# Patient Record
Sex: FEMALE | Race: WHITE | Employment: UNEMPLOYED | URBAN - METROPOLITAN AREA
[De-identification: names, ages, dates, MRNs, and addresses within clinical notes are randomized per-mention and may not be internally consistent; named-entity substitution may affect disease eponyms.]

---

## 2020-08-31 ENCOUNTER — HOSPITAL ENCOUNTER (OUTPATIENT)
Age: 85
Discharge: HOME | End: 2020-08-31
Payer: MEDICARE

## 2020-08-31 ENCOUNTER — HOSPITAL ENCOUNTER (OUTPATIENT)
Dept: GENERAL RADIOLOGY | Age: 85
Discharge: HOME | End: 2020-08-31
Payer: MEDICARE

## 2020-08-31 PROCEDURE — 73562 X-RAY EXAM OF KNEE 3: CPT

## 2020-08-31 PROCEDURE — 73502 X-RAY EXAM HIP UNI 2-3 VIEWS: CPT

## 2020-08-31 PROCEDURE — 72100 X-RAY EXAM L-S SPINE 2/3 VWS: CPT

## 2022-05-10 ENCOUNTER — OFFICE VISIT (OUTPATIENT)
Dept: SURGERY | Age: 87
End: 2022-05-10
Payer: MEDICARE

## 2022-05-10 VITALS
TEMPERATURE: 97.6 F | SYSTOLIC BLOOD PRESSURE: 152 MMHG | HEART RATE: 86 BPM | OXYGEN SATURATION: 97 % | DIASTOLIC BLOOD PRESSURE: 77 MMHG | BODY MASS INDEX: 35.7 KG/M2 | HEIGHT: 62 IN | WEIGHT: 194 LBS

## 2022-05-10 DIAGNOSIS — K64.3 GRADE IV HEMORRHOIDS: Primary | ICD-10-CM

## 2022-05-10 DIAGNOSIS — R15.9 INCONTINENCE OF FECES WITH FECAL URGENCY: ICD-10-CM

## 2022-05-10 DIAGNOSIS — R15.2 INCONTINENCE OF FECES WITH FECAL URGENCY: ICD-10-CM

## 2022-05-10 PROCEDURE — G8427 DOCREV CUR MEDS BY ELIG CLIN: HCPCS | Performed by: SURGERY

## 2022-05-10 PROCEDURE — 1036F TOBACCO NON-USER: CPT | Performed by: SURGERY

## 2022-05-10 PROCEDURE — 99203 OFFICE O/P NEW LOW 30 MIN: CPT | Performed by: SURGERY

## 2022-05-10 PROCEDURE — 1090F PRES/ABSN URINE INCON ASSESS: CPT | Performed by: SURGERY

## 2022-05-10 PROCEDURE — 4040F PNEUMOC VAC/ADMIN/RCVD: CPT | Performed by: SURGERY

## 2022-05-10 PROCEDURE — G8417 CALC BMI ABV UP PARAM F/U: HCPCS | Performed by: SURGERY

## 2022-05-10 PROCEDURE — 1123F ACP DISCUSS/DSCN MKR DOCD: CPT | Performed by: SURGERY

## 2022-05-10 RX ORDER — LOSARTAN POTASSIUM 25 MG/1
25 TABLET ORAL DAILY
COMMUNITY

## 2022-05-10 NOTE — PROGRESS NOTES
[facility]  [address]  Dept: [phone]  Dept Fax: [phone]  Loc: [phone]    Visit Date: 5/10/2022    Rogers Pierson is a 80 y.o. female who presents today for: New Patient (hemorrhoids)      HPI:       Rogers Pierson is a 80 y.o. female referred by Dr. Romario Michelle of  for hemorrhoids. Patient has had several years of hemorrhoids symptoms. Main complaints include: prolapse, occas discomfort. Also noted to have some fecal incontinence symptoms and fecal urgency. Symptoms worse with constipation  Patient has tried: OTC, suppostiries  Previous anorectal procedures/banding: yes - 5 yrs ago - [facility]    Patient denies fever, chills, abd pain, nausea, emesis, weight loss. Patient's problem list, medications, past medical, surgical, family, and social histories were reviewed and updated in the chart as indicated today. No past medical history on file. No past surgical history on file. Family History: Family history of colorectal cancer/polyps: no    Social History:   Social History     Tobacco Use    Smoking status: Never Smoker    Smokeless tobacco: Never Used   Substance Use Topics    Alcohol use: Yes      Tobacco cessation counseling provided as appropriate. REVIEW OF SYSTEMS:    Pertinent positives and negatives are mentioned in the HPI above. Otherwise, all other systems were reviewed and negative. Objective:     Physical Exam   BP (!) 152/77   Pulse 86   Temp 97.6 °F (36.4 °C) (Infrared)   Ht 5' 2\" (1.575 m)   Wt 194 lb (88 kg)   SpO2 97%   BMI 35.48 kg/m²   Constitutional: Appears well-developed and well-nourished. Grooming appropriate. No gross deformities. Body mass index is 35.48 kg/m². Eyes: No scleral icterus. Conjunctiva/lids normal. Vision intact grossly. Pupils equal/symmetric, reactive bilaterally.   ENT: External ears/nose without defect, scars, or masses. Hearing grossly intact. No facial deformity. Lips normal, normal dentition. Neck: No masses. Trachea midline. No crepitus. Thyroid not enlarged. Cardiovascular: Normal rate. No peripheral edema. Abdominal aorta normal size to palpation. Pulmonary/Chest: Effort normal. No respiratory distress. No wheezes. No use of accessory muscles. Musculoskeletal: Normal range of motion x all 4 extremities and head/neck, without deformity, pain, or crepitus, with normal strength and tone. Normal gait. Nails without clubbing or cyanosis. Neurological: Alert and oriented to person, place, and time. No gross deficits. Sensation intact. Skin: Skin is dry. No rashes noted. No pallor. No induration of nodules. Psychiatric: Normal mood and affect. Behavior normal. Oriented to person, place, and time. Judgment and insight reasonable. Abdominal/wound: soft, nontender    Anorectal exam:    ANOSCOPY:    Chaperone/MA present in room during entire exam. Patient was placed in left lateral down or knee-chest positioning depending on patient comfort. Exam table manipulated for proper visualization and lighting. Buttocks spread. Inspection reveals: no perianal skin lesions, excoriation. Grade 4 prolapsing internal/external hemorrhoids    Digital exam performed with lubricated index finger revealing: no masses, induration, or tenderness. No gross blood. Normal tone. Lubricated anoscope inserted gently into anus and withdrawn with light attachment for visualization of distal rectum and anal canal: Mucosa appeared normal, without masses or evidence of proctitis. Grade 4 internal/external hemorrhoidal tissue noted. Anoscope removed without difficulty. Patient tolerated procedure well without complications. See below for further recommendations.       Last colonoscopy: 5 years ago per her recollection      Assessment/Plan:     A/P:  New problem(s): Hemorrhoids: Internal/external, grade 4; also with fecal urgency/incontinence  Established problem(s): constiption  Additional workup/treatment planned and options discussed:   1) Lifestyle and stool regimen: Fiber supplementation, sitz baths, improving dietary and lifestyle choices  2) Procedural: Rubber band ligation  3) Hemorrhoidectomy - if fails less invasive meaures  Risk of complications/morbidity: moderate    Patient has signs, symptoms, and exam consistent with grade 4 internal/external hemorrhoids. States that her symptoms are worse when she has issues with constipation. We discussed the pathophysiology, etiology, natural history, workup, and treatment options, including procedural and surgical risks for hemorrhoids. We will start with conservative management, including OTC or prescription fiber supplementation, water, healthy fruits and vegetables, and medical management. We discussed avoiding straining, constipation, and diarrhea. Sitz baths discussed. If symptoms do not improve in a reasonable amount of time or patient is already having regular soft stool with conservative management, patient may require more invasive treatment options, such as in-office rubber band ligation. We discussed the rubber band ligation procedure, including prep, expectations, potential complications, postoperative discomfort. We discussed that this would be without sedation or anesthesia, and would likely result in temporary mild pressure discomfort. We discussed functional expectations, and risks of recurrence of hemorrhoids in the future. We discussed potential need for multiple banding sessions. As we were getting ready to end the conversation she also brought up fecal incontinence. I provided additional information and a print out for her regarding this, but we will need to discuss this further at her next office visit given our time constraints today.     Up to date on colonoscopy per her recollection    I provided written information in the After Visit Summary AVS Regarding: Hemorrhoids, rubber band ligation, fecal incontinence    DISPOSITION:  F/u for symptom reassessment, possible rubber band ligation; further discussion regarding fecal incontinence issues    My findings will be relayed to consulting practitioner or PCP via Epic    Note completed using dictation software, please excuse any errors.     Electronically signed by Rose Garcia MD on 5/10/2022 at 4:12 PM

## 2022-05-10 NOTE — PATIENT INSTRUCTIONS
Fecal Incontinence: Information and Care Instructions    Fecal incontinence is the loss of normal control of your bowels. You may not be able to reach the toilet in time for a bowel movement, or stool may leak from your anus. Fecal incontinence can be caused by constipation, diarrhea, or anxiety or other emotional stress. It can also result from nerve injury, muscle damage (especially from childbirth), lack of exercise, or poor diet. Some medications or chronic medical problems (such as diabetes) can cause or worsen incontinence. Treatment of fecal incontinence depends on what caused it and how bad it is. It may include changes to your diet, medicine, bowel training, or surgery. More than one treatment may be needed. Loss of bowel control can be hard to deal with. You may feel ashamed or embarrassed, and you may not want to leave the house because you fear that you might have an accident in public. But treatment can help you better control your bowels and manage your incontinence. Follow-up care is a key part of your treatment and safety. Be sure to make and go to all appointments, and call your doctor if you are having problems. It's also a good idea to know your test results and keep a list of the medicines you take. How can you care for yourself at home? · Keep a diary for 2 weeks - record how often you have incontinence, and whether you loose control of solid, liquid, or gas. Write down what you eat. This will help you learn which foods make your incontinence worse. · If you and your doctor feel that constipation is part of the problem:  ¨ Include fruits, vegetables, beans, and whole grains in your diet each day. These foods are high in fiber. ¨ Drink plenty of fluids, enough so that your urine is light yellow or clear like water. If you have kidney, heart, or liver disease and have to limit fluids, talk with your doctor before you increase the amount of fluids you drink.   ¨ Get some exercise every day. Build up slowly to 30 to 60 minutes a day on 5 or more days of the week. ¨ Take a fiber supplement, such as Citrucel or Metamucil, every day. Read and follow all instructions on the label. · If you and your doctor feel that diarrhea is part of the problem, try to avoid:  ¨ Alcohol. ¨ Caffeine. This is found in coffee, tea, cola drinks, and chocolate. ¨ Nicotine, from smoking or chewing tobacco.  ¨ Gas-producing foods. These include beans, broccoli, cabbage, and apples. ¨ Dairy products that contain lactose (milk sugar). Examples are ice cream, milk, cheese, and sour cream.  ¨ Foods and drinks high in sugar, especially fruit juice, soda, candy, and other packaged sweets (such as cookies). ¨ Foods high in fat. These include duffy, sausage, butter, oils, and anything deep-fried. ¨ Sorbitol and xylitol. These artificial sweeteners are found in some sugarless candies and chewing gum. · Sometimes additional medications such as imodium or lomotil are used to control diarrhea, which can contribute to incontinence. Start with a low dose, and slowly increase until stools are regular and soft without the need to strain. · How can you keep yourself clean:  ¨ After bowel movements, cleanse gently with non-medicated moist baby wipes. Let air dry, and then use a zinc-based barrier cream (such as Desitin or Calmoseptine) to protect the skin. Wear loose fitting clothing. Avoid using rough toilet paper or excessive wiping. · Do pelvic floor (Kegel) exercises, which tighten and strengthen the pelvic muscles. To do Kegel exercises:  ¨ Squeeze the same muscles you would use to stop your urine. Your belly and thighs should not move. ¨ Hold the squeeze for 3 seconds, and then relax for 3 seconds. ¨ Start with 3 seconds. Then add 1 second each week until you are able to squeeze for 10 seconds. ¨ Repeat the exercise 10 to 15 times a session.  Do three or more sessions a day    What are the next steps if symptoms are not improved with diet changes and home exercises? · Most patients will be referred for pelvic floor physical therapy. This is offered at [facility] [place]. There are specialized physical therapists that will help you regain strength in your pelvic muscles and retrain you to sense your bowel function. This takes place over the course of about 3 months. · Some patients will be referred to a Urogynecologist if there are concomitant urinary or gynecologic issues that may be contributing to fecal incontinence    · Depending on improvement from physical therapy and dietary changes, some patients may be sent for additional testing, such as ultrasound, barium enemas, colonoscopy, or MRI. · Some patients find much improvement in their quality of life with a sacral nerve stimulator. This procedure is performed in two parts. First, temporary wires (\"leads\") are placed, and your response is measured over the course of one week. If you have at least a 50% improvement in symptoms, a second procedure is performed in which the permament stimulator is implanted. Both of these procedures are performed in the operating room with sedation, and are both same-day surgeries. Long term success rates are generally very good. · Rarely patients may be candidates for surgical sphincter reconstruction, though long term success rates are a bit discouraging. · Occasionally, there are other reasons for incontinence. In these specific scenarios, the primary problem should be addressed: rectal prolapse, rectal cancer or polyps, severe hemorrhoids, rectocele, Crohn's disease, ulcerative colitis, certain infections of the colon or rectum. Most of these can be assessed for by office exam and colonoscopy. Please remember, fecal incontinence can be complex, and there may be multiple factors involved in addressing and treating this disorder. Be patient and diligent.     You will see the surgeon every 2-3 months depending on your progress and improvement with various treatments. Please call the office at any time if you have any questions or need to set up a follow up appointment: [phone]          Hemorrhoids: Information and Care Instructions        Hemorrhoids are enlarged veins that develop in the anal canal. They can occur with constipation, diarrhea, straining and pushing during bowel movements, pregnancy, and smoking/coughing. The tissue can get irritated and inflamed, causing bleeding, itching, swelling, and pain. Hemorrhoids can be internal or external (or occasionally both). Sometimes internal hemorrhoids can prolapse, or come out of the anus, causing difficulty keeping clean, mucus drainage, bleeding, and discomfort. External hemorrhoids are more likely to clot and cause sudden severe pain on the outside of the anus. They can be uncomfortable at times, but hemorrhoids rarely are a life-threatening problem. However, not all bleeding and pain can be blamed on hemorrhoids until a thorough examination (and sometimes a colonoscopy) is performed. The initial treatment for both internal and external hemorrhoids is the same, as below:    STOOL REGIMEN for hemorrhoids:    Stools should be soft, formed, and easy to pass consistency, not diarrhea, without the need to strain. 1) Eat a healthy diet with lots of fruits and vegetables. Exercise and be active. 2) Use a fiber supplement twice daily (Metamucil, Benefiber, Konsyl, Citrucel, generic brand, etc) per package instructions. - Women should aim for 25 grams of fiber daily.    - Men should aim for 30-35 grams of fiber daily. 3) Drink 6-8 glasses of water per day. This will work with the fiber to regulate your stools. 4) MiraLax is safe to use every day, and can be used to help lubricate and soften stool. 5) Colace stool softeners can also be used as needed.  Avoid Senna and sennakot laxative products, as they may damage the colon with long-term use.    Warm water sitz baths (sit in a tub filled with 3-4 inches of warm water) can be done 1-2 times daily for 5 min to help with hygiene and relaxation. DO NOT STRAIN ON THE TOILET. DO NOT READ OR PLAY CELL PHONE GAMES ON THE TOILET. Stool regimen should be trialed for 6-8 weeks before considering additional procedures or surgery. What procedures are available for internal hemorrhoids that do not respond to the above stool regimen:    · Rubber Band Ligation: This procedure is performed in the office without anesthetic. A small scope is placed into the anus and small rubber bands are placed over the hemorrhoid tissue. This causes excess tissue to fall off and scar into the rectum. This is done for patients with internal hemorrhoids only. Often this procedure needs to be repeated. Complication rates are very low. There is only minor discomfort and no down time. · Surgical excision (Hemorrhoidectomy): This surgery is performed in the operating room with sedation. The hemorrhoid tissue is cut out and wounds are stitched back together. It has low complications rates and has a 95% long term success rate. It is a painful procedure, however, and most patients require at least 2 weeks off from work/school. This may be recommended for patients with large hemorrhoids, and those who wish to have internal and external hemorrhoids addressed simultaneously, and those who desire the most definitive procedure. · Colonoscopy: This is an adjunct procedure in patients with rectal bleeding. Depending on your age and family history, colonoscopy may be recommended before pursuing hemorrhoid procedures. This is to ensure that the source of bleeding is truly hemorrhoids, and not from polyps, cancer, or inflammation located elsewhere in the colon or rectum. What about over-the-counter ointments, creams, and suppositories?     Unfortunately for consumers, there is very little actual scientific data to support the use of any over-the-counter products. These usually aren't harmful to try for a few weeks, and may help with some symptoms, but all in all are a waste of money. Again, these will just merely mask the symptoms and do not actually address the underlying problem. Some of these (especially steroid-based creams), can actually cause damage to the anus and skin if used over a longer period of time (more than 3 weeks). What about external hemorrhoids? External hemorrhoids can clot or \"thrombose\". This can cause sudden onset of severe pain. Typically the clot will dissolve or push through the skin on its own within 5-7 days. However, if patients are seen within the first 1-3 days of the start of the pain, the clot and external hemorrhoid can be excised (cut) in the office, reducing the duration of pain and reducing healing time. This is typically done with local anesthetic injection. After an external hemorrhoid heals, typically there is a small skin tag that is left behind. No treatment is necessary for skin tags unless there is interference with hygiene. When should you call the office? · You have any questions or concerns. · You have excessive bleeding, fever, chills, or inability to urinate. · The office phone # is [phone]  · If you are unable to reach the office (outside of normal business hours) and you have any concerns, go to your nearest emergency room.

## 2022-06-30 ENCOUNTER — TELEPHONE (OUTPATIENT)
Dept: SURGERY | Age: 87
End: 2022-06-30

## 2022-08-10 ENCOUNTER — OFFICE VISIT (OUTPATIENT)
Dept: SURGERY | Age: 87
End: 2022-08-10
Payer: MEDICARE

## 2022-08-10 VITALS
HEART RATE: 73 BPM | DIASTOLIC BLOOD PRESSURE: 73 MMHG | BODY MASS INDEX: 36.25 KG/M2 | TEMPERATURE: 97.5 F | OXYGEN SATURATION: 96 % | SYSTOLIC BLOOD PRESSURE: 162 MMHG | HEIGHT: 62 IN | WEIGHT: 197 LBS

## 2022-08-10 DIAGNOSIS — R15.9 INCONTINENCE OF FECES WITH FECAL URGENCY: ICD-10-CM

## 2022-08-10 DIAGNOSIS — K64.3 GRADE IV HEMORRHOIDS: Primary | ICD-10-CM

## 2022-08-10 DIAGNOSIS — R15.2 INCONTINENCE OF FECES WITH FECAL URGENCY: ICD-10-CM

## 2022-08-10 PROCEDURE — G8427 DOCREV CUR MEDS BY ELIG CLIN: HCPCS | Performed by: SURGERY

## 2022-08-10 PROCEDURE — 1123F ACP DISCUSS/DSCN MKR DOCD: CPT | Performed by: SURGERY

## 2022-08-10 PROCEDURE — G8417 CALC BMI ABV UP PARAM F/U: HCPCS | Performed by: SURGERY

## 2022-08-10 PROCEDURE — 99214 OFFICE O/P EST MOD 30 MIN: CPT | Performed by: SURGERY

## 2022-08-10 PROCEDURE — 1090F PRES/ABSN URINE INCON ASSESS: CPT | Performed by: SURGERY

## 2022-08-10 PROCEDURE — 1036F TOBACCO NON-USER: CPT | Performed by: SURGERY

## 2022-08-10 NOTE — PROGRESS NOTES
well-developed and well-nourished. Grooming appropriate. No gross deformities. Body mass index is 36.03 kg/m². Eyes: No scleral icterus. Conjunctiva/lids normal. Vision intact grossly. Pupils equal/symmetric, reactive bilaterally. ENT: External ears/nose without defect, scars, or masses. Hearing grossly intact. No facial deformity. Lips normal, normal dentition. Neck: No masses. Trachea midline. No crepitus. Thyroid not enlarged. Cardiovascular: Normal rate. No peripheral edema. Abdominal aorta normal size to palpation. Pulmonary/Chest: Effort normal. No respiratory distress. No wheezes. No use of accessory muscles. Musculoskeletal: Normal range of motion of head/neck, without deformity, pain, or crepitus, with normal strength and tone. Normal gait. Nails without clubbing or cyanosis. Neurological: Alert and oriented to person, place, and time. No gross deficits. Sensation intact. Skin: Skin is dry. No rashes noted. No pallor. No induration of nodules. Psychiatric: Normal mood and affect. Behavior normal. Oriented to person, place, and time. Judgment and insight reasonable. Abdominal/wound: Soft, nontender, nondistended    Anorectal Exam: Chaperone present in room throughout exam.  Patient placed in the left lateral position. Buttocks spread. Digital rectal exam performed with lubricated finger. Anoscopy performed. Findings reveal: Good squeeze tone, moderately poor resting tone, positive rectocele    Labs reviewed: None     Imaging reviewed: None    Assessment/Plan:       A/P:  New problem(s) with uncertain prognosis: Fecal incontinence  Established problem(s): Grade 4 hemorrhoids  Additional workup/treatment planned: Pelvic floor physical therapy, consider sacral nerve stimulator  Risk of complications/morbidity: Moderate    I discussed with Brittni Young the multifactorial etiology of pelvic floor dysfunction and fecal incontinence. Discussed stool bulking with fiber as the initial first step. Discussed pelvic floor physical therapy can be helpful for a lot of women as well. If both of these fail, we discussed sacral nerve stimulator implantation. I discussed with her the risks of the surgery and common expectations. Discussed that this would be done in 2 stages. If all else fails, I mentioned colostomy as a last resort. She is not interested in colostomy at this time. Continue with current prescription medications    DISPOSITION: Follow-up with me in the future if pelvic floor physical therapy is not helpful    My findings will be relayed to consulting practitioner or PCP via Epic note    Note completed using dictation software, please excuse any errors.     Electronically signed by Carlos Valadez MD on 8/10/2022 at 2:13 PM

## 2022-08-12 ENCOUNTER — TELEPHONE (OUTPATIENT)
Dept: SURGERY | Age: 87
End: 2022-08-12

## 2022-08-12 NOTE — TELEPHONE ENCOUNTER
Patient called asking where she should go for pelvic floor physical therapy. She would like to go somewhere close to home if possible.     Please call: [phone]

## 2022-08-16 DIAGNOSIS — R15.2 INCONTINENCE OF FECES WITH FECAL URGENCY: Primary | ICD-10-CM

## 2022-08-16 DIAGNOSIS — M62.89 PELVIC FLOOR DYSFUNCTION: ICD-10-CM

## 2022-08-16 DIAGNOSIS — R15.9 INCONTINENCE OF FECES WITH FECAL URGENCY: Primary | ICD-10-CM

## 2022-08-16 NOTE — TELEPHONE ENCOUNTER
Patient advised.   Gave her information for [facility], , and [address]

## 2022-08-16 NOTE — TELEPHONE ENCOUNTER
Referral faxed to [address] [place] [address] [phone]  [facility] Location. Patient advised.

## 2023-04-06 ENCOUNTER — OFFICE VISIT (OUTPATIENT)
Dept: INTERNAL MEDICINE CLINIC | Age: 88
End: 2023-04-06
Payer: MEDICARE

## 2023-04-06 VITALS
DIASTOLIC BLOOD PRESSURE: 84 MMHG | TEMPERATURE: 97.9 F | OXYGEN SATURATION: 99 % | BODY MASS INDEX: 35.48 KG/M2 | WEIGHT: 194 LBS | SYSTOLIC BLOOD PRESSURE: 136 MMHG | HEART RATE: 83 BPM

## 2023-04-06 DIAGNOSIS — Z91.81 AT HIGH RISK FOR FALLS: ICD-10-CM

## 2023-04-06 DIAGNOSIS — E66.09 CLASS 2 OBESITY DUE TO EXCESS CALORIES WITHOUT SERIOUS COMORBIDITY WITH BODY MASS INDEX (BMI) OF 35.0 TO 35.9 IN ADULT: ICD-10-CM

## 2023-04-06 DIAGNOSIS — E11.9 TYPE 2 DIABETES MELLITUS WITHOUT COMPLICATION, WITHOUT LONG-TERM CURRENT USE OF INSULIN (HCC): ICD-10-CM

## 2023-04-06 DIAGNOSIS — M54.41 BILATERAL LOW BACK PAIN WITH RIGHT-SIDED SCIATICA, UNSPECIFIED CHRONICITY: ICD-10-CM

## 2023-04-06 DIAGNOSIS — C95.91 LEUKEMIA IN REMISSION, UNSPECIFIED LEUKEMIA TYPE (HCC): ICD-10-CM

## 2023-04-06 DIAGNOSIS — I10 PRIMARY HYPERTENSION: Primary | ICD-10-CM

## 2023-04-06 PROBLEM — E66.812 CLASS 2 OBESITY DUE TO EXCESS CALORIES WITHOUT SERIOUS COMORBIDITY WITH BODY MASS INDEX (BMI) OF 35.0 TO 35.9 IN ADULT: Status: ACTIVE | Noted: 2023-04-06

## 2023-04-06 PROCEDURE — 99214 OFFICE O/P EST MOD 30 MIN: CPT | Performed by: INTERNAL MEDICINE

## 2023-04-06 PROCEDURE — 1123F ACP DISCUSS/DSCN MKR DOCD: CPT | Performed by: INTERNAL MEDICINE

## 2023-04-06 PROCEDURE — G8427 DOCREV CUR MEDS BY ELIG CLIN: HCPCS | Performed by: INTERNAL MEDICINE

## 2023-04-06 PROCEDURE — G8417 CALC BMI ABV UP PARAM F/U: HCPCS | Performed by: INTERNAL MEDICINE

## 2023-04-06 PROCEDURE — 1090F PRES/ABSN URINE INCON ASSESS: CPT | Performed by: INTERNAL MEDICINE

## 2023-04-06 PROCEDURE — 1036F TOBACCO NON-USER: CPT | Performed by: INTERNAL MEDICINE

## 2023-04-06 PROCEDURE — 82962 GLUCOSE BLOOD TEST: CPT | Performed by: INTERNAL MEDICINE

## 2023-04-06 RX ORDER — METFORMIN HYDROCHLORIDE 500 MG/1
500 TABLET, EXTENDED RELEASE ORAL
COMMUNITY

## 2023-04-06 RX ORDER — LANCETS 30 GAUGE
1 EACH MISCELLANEOUS DAILY
Qty: 100 EACH | Refills: 5 | Status: SHIPPED | OUTPATIENT
Start: 2023-04-06

## 2023-04-06 RX ORDER — GLUCOSAMINE HCL/CHONDROITIN SU 500-400 MG
CAPSULE ORAL
Qty: 300 STRIP | Refills: 5 | Status: SHIPPED | OUTPATIENT
Start: 2023-04-06

## 2023-04-06 RX ORDER — BLOOD-GLUCOSE METER
1 KIT MISCELLANEOUS DAILY
Qty: 1 KIT | Refills: 0 | Status: SHIPPED | OUTPATIENT
Start: 2023-04-06

## 2023-04-06 SDOH — ECONOMIC STABILITY: FOOD INSECURITY: WITHIN THE PAST 12 MONTHS, YOU WORRIED THAT YOUR FOOD WOULD RUN OUT BEFORE YOU GOT MONEY TO BUY MORE.: NEVER TRUE

## 2023-04-06 SDOH — ECONOMIC STABILITY: HOUSING INSECURITY
IN THE LAST 12 MONTHS, WAS THERE A TIME WHEN YOU DID NOT HAVE A STEADY PLACE TO SLEEP OR SLEPT IN A SHELTER (INCLUDING NOW)?: NO

## 2023-04-06 SDOH — ECONOMIC STABILITY: FOOD INSECURITY: WITHIN THE PAST 12 MONTHS, THE FOOD YOU BOUGHT JUST DIDN'T LAST AND YOU DIDN'T HAVE MONEY TO GET MORE.: NEVER TRUE

## 2023-04-06 SDOH — ECONOMIC STABILITY: INCOME INSECURITY: HOW HARD IS IT FOR YOU TO PAY FOR THE VERY BASICS LIKE FOOD, HOUSING, MEDICAL CARE, AND HEATING?: NOT HARD AT ALL

## 2023-04-06 ASSESSMENT — PATIENT HEALTH QUESTIONNAIRE - PHQ9
1. LITTLE INTEREST OR PLEASURE IN DOING THINGS: 0
SUM OF ALL RESPONSES TO PHQ9 QUESTIONS 1 & 2: 0
SUM OF ALL RESPONSES TO PHQ QUESTIONS 1-9: 0
2. FEELING DOWN, DEPRESSED OR HOPELESS: 0
SUM OF ALL RESPONSES TO PHQ QUESTIONS 1-9: 0

## 2023-04-06 NOTE — PROGRESS NOTES
[facility]  Internal Medicine  New Patient Note  Bisi Leal, DO      2023    Andrew Santiago (:  1935) is a 80 y.o. female, here for evaluation of the following medical concerns:      SUBJECTIVE:    HPI    Patient is an 80-year-old female with past medical history of hypertension, diabetes, leukemia, osteoarthritis, sleep apnea, hyperlipidemia who presents secondary to follow-up for the above listed chronic diseases and to establish care. Diabetes: Patient has been on metformin 500 mg extended release for some time. Patient tolerating this well. Patient does not know how to check blood sugars. She does not think she has had any low blood sugars. Thinks she may have had lab work about 1 month ago. HTN: Patient has been on losartan for some time for this. Tolerates it well. Thinks she had lab work about 1 month ago. Osteoarthritis:  Patient notes a lot of pain everywhere. Overall manages. Does note some back pain with some sciatic-like symptoms down the right leg. Notes this is better today. Denies any weakness in the legs bilaterally. Review of Systems ROS negative except for those noted in the HPI above. Outpatient Medications Marked as Taking for the 23 encounter (Office Visit) with Susan Sanchez, DO   Medication Sig Dispense Refill    metFORMIN (GLUCOPHAGE-XR) 500 MG extended release tablet Take 1 tablet by mouth daily (with breakfast)      glucose monitoring (FREESTYLE FREEDOM) kit 1 kit by Does not apply route daily 1 kit 0    blood glucose monitor strips Test as needed for symptoms of irregular blood glucose. Dispense sufficient amount for indicated testing frequency plus additional to accommodate PRN testing needs.  300 strip 5    Lancets MISC 1 each by Does not apply route daily 100 each 5    losartan (COZAAR) 25 MG tablet Take 1 tablet by mouth daily          Allergies   Allergen Reactions    Latex     Adhesive Tape     Aspartame     Doxycycline

## 2023-04-07 ENCOUNTER — TELEPHONE (OUTPATIENT)
Dept: INTERNAL MEDICINE CLINIC | Age: 88
End: 2023-04-07

## 2023-04-07 RX ORDER — LOSARTAN POTASSIUM 50 MG/1
50 TABLET ORAL DAILY
COMMUNITY
End: 2023-05-25

## 2023-05-01 ENCOUNTER — TELEPHONE (OUTPATIENT)
Dept: INTERNAL MEDICINE CLINIC | Age: 88
End: 2023-05-01

## 2023-05-01 DIAGNOSIS — E53.8 VITAMIN B12 DEFICIENCY: Primary | ICD-10-CM

## 2023-05-01 NOTE — TELEPHONE ENCOUNTER
Patient called back for her lab results. Gave her the following results:  \"Please call patient and let her know that I reviewed her labs. Her diabetes seems to be very well controlled. Her cholesterol is slightly elevated, however I would not add any medication for this at this time. Everything else looks great. \"    Patient is wondering about her B-12 value. She states this was her main concern for the blood work. Patient is wondering if she should began getting a B-12 shot? She used to get this monthly to help with her memory when she saw her previous PCP, before he retired. Please advise. Patient states she may want to start getting these.

## 2023-05-01 NOTE — TELEPHONE ENCOUNTER
Please let patient know that her b12 was not checked. I was not aware she had been on b12 injections with her prior PCP. I would like to check her level, order already placed, please have her come in to do so. We will decide from there if needed. Please ask when her last injection was.

## 2023-05-01 NOTE — TELEPHONE ENCOUNTER
Spoke with patient apologized that we did not draw that lab. She will come back and have that drawn .  She stated her last B12 injection was November last year

## 2023-05-02 DIAGNOSIS — E53.8 VITAMIN B12 DEFICIENCY: ICD-10-CM

## 2023-05-02 NOTE — TELEPHONE ENCOUNTER
Patient is here and confused. She is under the impression that she is to get her B-12 injection today. Miller Schmidt note to her as follows:      Please let patient know that her b12 was not checked. I was not aware she had been on b12 injections with her prior PCP. I would like to check her level, order already placed, please have her come in to do so. We will decide from there if needed. Please ask when her last injection was. She still seemed confused but did go to the lab for the blood draw. Signed and Faxed Document

## 2023-05-03 LAB — VIT B12 SERPL-MCNC: 522 PG/ML (ref 211–911)

## 2023-05-10 ENCOUNTER — TELEPHONE (OUTPATIENT)
Dept: INTERNAL MEDICINE CLINIC | Age: 88
End: 2023-05-10

## 2023-05-10 NOTE — TELEPHONE ENCOUNTER
Patient called for her lab results. Gave her the following message from Dr Nabor Sterling:  Mohsen Isabel let Ms. Carlito Tian know that her vitamin b12 is currently in normal range. It has been about 6 months since she received b12 injection. Please ask if she has been on oral supplementation. We may be able to maintain on this instead of injections now. \"      Patient states for the past several weeks she has been taking a \"Super B Complex by [employer]. \" She is hoping this will help strengthen her nails and help with preventing additional hair loss. Patient states she tries to take this everyday but often forgets. She is wondering if Dr Nabor Sterling wants her to continue this instead of getting an injection. Please advise.

## 2023-05-11 NOTE — TELEPHONE ENCOUNTER
Yes, lets try continuing this instead of the injections for now. We will plan to repeat the levels at her next appointment and be sure they are sustaining and not dropping.

## 2023-05-19 ENCOUNTER — TELEPHONE (OUTPATIENT)
Dept: INTERNAL MEDICINE CLINIC | Age: 88
End: 2023-05-19

## 2023-05-19 NOTE — TELEPHONE ENCOUNTER
----- Message from Carolann Xie sent at 2023  1:13 PM EDT -----  Subject: Message to Provider    QUESTIONS  Information for Provider? patient needs script for handicap parking mailed to her home so that she can take get renewed, due to  in august  ---------------------------------------------------------------------------  --------------  [address]  [phone]; OK to leave message on voicemail  ---------------------------------------------------------------------------  --------------  SCRIPT ANSWERS  Relationship to Patient?  Self
No previous letter in [address] [place] [address] [address] [address] Maribeth Fong for letter?
Okay for letter. I have placed it in epic. Thank you!
Patient asked for the letter to be mailed out put it in the out going mail.
no

## 2023-05-25 RX ORDER — LOSARTAN POTASSIUM 50 MG/1
TABLET ORAL
Qty: 90 TABLET | Refills: 1 | Status: SHIPPED | OUTPATIENT
Start: 2023-05-25

## 2023-05-30 RX ORDER — METFORMIN HYDROCHLORIDE 500 MG/1
TABLET, EXTENDED RELEASE ORAL
Qty: 90 TABLET | Refills: 1 | Status: SHIPPED | OUTPATIENT
Start: 2023-05-30

## 2023-07-06 ENCOUNTER — OFFICE VISIT (OUTPATIENT)
Dept: INTERNAL MEDICINE CLINIC | Age: 88
End: 2023-07-06
Payer: MEDICARE

## 2023-07-06 DIAGNOSIS — Z85.6 HISTORY OF LEUKEMIA: ICD-10-CM

## 2023-07-06 DIAGNOSIS — M19.041 ARTHRITIS OF BOTH HANDS: ICD-10-CM

## 2023-07-06 DIAGNOSIS — Z00.00 INITIAL MEDICARE ANNUAL WELLNESS VISIT: Primary | ICD-10-CM

## 2023-07-06 DIAGNOSIS — E11.9 TYPE 2 DIABETES MELLITUS WITHOUT COMPLICATION, WITHOUT LONG-TERM CURRENT USE OF INSULIN (HCC): ICD-10-CM

## 2023-07-06 DIAGNOSIS — I10 PRIMARY HYPERTENSION: ICD-10-CM

## 2023-07-06 DIAGNOSIS — M54.2 NECK PAIN: ICD-10-CM

## 2023-07-06 DIAGNOSIS — M19.042 ARTHRITIS OF BOTH HANDS: ICD-10-CM

## 2023-07-06 PROCEDURE — 82962 GLUCOSE BLOOD TEST: CPT | Performed by: INTERNAL MEDICINE

## 2023-07-06 PROCEDURE — G0438 PPPS, INITIAL VISIT: HCPCS | Performed by: INTERNAL MEDICINE

## 2023-07-06 PROCEDURE — 3044F HG A1C LEVEL LT 7.0%: CPT | Performed by: INTERNAL MEDICINE

## 2023-07-06 PROCEDURE — 1123F ACP DISCUSS/DSCN MKR DOCD: CPT | Performed by: INTERNAL MEDICINE

## 2023-07-06 ASSESSMENT — LIFESTYLE VARIABLES
HOW OFTEN DO YOU HAVE A DRINK CONTAINING ALCOHOL: MONTHLY OR LESS
HOW MANY STANDARD DRINKS CONTAINING ALCOHOL DO YOU HAVE ON A TYPICAL DAY: 1 OR 2

## 2023-07-06 ASSESSMENT — PATIENT HEALTH QUESTIONNAIRE - PHQ9
SUM OF ALL RESPONSES TO PHQ QUESTIONS 1-9: 0
SUM OF ALL RESPONSES TO PHQ9 QUESTIONS 1 & 2: 0
SUM OF ALL RESPONSES TO PHQ QUESTIONS 1-9: 0
SUM OF ALL RESPONSES TO PHQ QUESTIONS 1-9: 0
1. LITTLE INTEREST OR PLEASURE IN DOING THINGS: 0
SUM OF ALL RESPONSES TO PHQ QUESTIONS 1-9: 0
2. FEELING DOWN, DEPRESSED OR HOPELESS: 0

## 2023-07-07 ENCOUNTER — TELEPHONE (OUTPATIENT)
Dept: PHARMACY | Facility: CLINIC | Age: 88
End: 2023-07-07

## 2023-07-07 LAB
ALBUMIN SERPL-MCNC: 4 G/DL (ref 3.4–5)
ALBUMIN/GLOB SERPL: 1.7 {RATIO} (ref 1.1–2.2)
ALP SERPL-CCNC: 107 U/L (ref 40–129)
ALT SERPL-CCNC: 12 U/L (ref 10–40)
ANION GAP SERPL CALCULATED.3IONS-SCNC: 15 MMOL/L (ref 3–16)
AST SERPL-CCNC: 17 U/L (ref 15–37)
BASOPHILS # BLD: 0.2 K/UL (ref 0–0.2)
BASOPHILS NFR BLD: 2 %
BILIRUB SERPL-MCNC: 0.3 MG/DL (ref 0–1)
BUN SERPL-MCNC: 16 MG/DL (ref 7–20)
CALCIUM SERPL-MCNC: 9.4 MG/DL (ref 8.3–10.6)
CHLORIDE SERPL-SCNC: 104 MMOL/L (ref 99–110)
CO2 SERPL-SCNC: 22 MMOL/L (ref 21–32)
CREAT SERPL-MCNC: 0.8 MG/DL (ref 0.6–1.2)
DEPRECATED RDW RBC AUTO: 15.9 % (ref 12.4–15.4)
EOSINOPHIL # BLD: 0.4 K/UL (ref 0–0.6)
EOSINOPHIL NFR BLD: 4 %
EST. AVERAGE GLUCOSE BLD GHB EST-MCNC: 142.7 MG/DL
GFR SERPLBLD CREATININE-BSD FMLA CKD-EPI: >60 ML/MIN/{1.73_M2}
GLUCOSE SERPL-MCNC: 128 MG/DL (ref 70–99)
HBA1C MFR BLD: 6.6 %
HCT VFR BLD AUTO: 42.2 % (ref 36–48)
HGB BLD-MCNC: 14.5 G/DL (ref 12–16)
LYMPHOCYTES # BLD: 4.4 K/UL (ref 1–5.1)
LYMPHOCYTES NFR BLD: 43 %
MCH RBC QN AUTO: 30.2 PG (ref 26–34)
MCHC RBC AUTO-ENTMCNC: 34.3 G/DL (ref 31–36)
MCV RBC AUTO: 88.1 FL (ref 80–100)
MONOCYTES # BLD: 0.5 K/UL (ref 0–1.3)
MONOCYTES NFR BLD: 5 %
NEUTROPHILS # BLD: 4.7 K/UL (ref 1.7–7.7)
NEUTROPHILS NFR BLD: 45 %
NEUTS BAND NFR BLD MANUAL: 1 % (ref 0–7)
PLATELET # BLD AUTO: 248 K/UL (ref 135–450)
PMV BLD AUTO: 8.4 FL (ref 5–10.5)
POTASSIUM SERPL-SCNC: 4.5 MMOL/L (ref 3.5–5.1)
PROT SERPL-MCNC: 6.4 G/DL (ref 6.4–8.2)
RBC # BLD AUTO: 4.79 M/UL (ref 4–5.2)
RBC MORPH BLD: ABNORMAL
SODIUM SERPL-SCNC: 141 MMOL/L (ref 136–145)
WBC # BLD AUTO: 10.2 K/UL (ref 4–11)

## 2023-07-07 NOTE — TELEPHONE ENCOUNTER
[facility] CLINICAL PHARMACY: ADHERENCE REVIEW  Identified care gap per [employer]: fills at Non-preferred pharmacy: [facility]: ACE/ARB and Diabetes adherence      ASSESSMENT    ACE/ARB ADHERENCE    Insurance Records claims through 23 (Prior Year [address] = not reported; YTD [address] = 70%; Potential Fail Date: 23):   LOSARTAN POT TAB 50MG last filled on 23 for 90 day supply. Next refill due: 23      BP Readings from Last 3 Encounters:   23 137/75   23 136/84   08/10/22 (!) 162/73     Estimated Creatinine Clearance: 50 mL/min (based on SCr of 0.8 mg/dL). Lab Results   Component Value Date    CREATININE 0.8 2023     Lab Results   Component Value Date    K 4.5 2023     DIABETES ADHERENCE    Insurance Records claims through 23 (Prior Year [address] = not reported; YTD [address] = FIRST FILL; Potential Fail Date: 23): METFORMIN    TAB 500MG ER last filled on 23 for 90 day supply. Next refill due: 5/15/23    Prescribed si tablet/capsule daily    Per Insurer Portal:   -23  +23     Per Pharmacy, refills on file, will process and have ready for the patient today 90ds $10.75  Lab Results   Component Value Date    LABA1C 6.6 2023    LABA1C 6.2 04/15/2023   \    PLAN  Per insurer report, LIS-0 - co-pays are based on tiers and patient is subject to coverage gap. Patient found in Outcomes MTM and is not currently eligible for CMR or TIP    The following are interventions that have been identified:   Patient overdue refilling metformin and active on home medication list.   Patient filling at a non-preferred pharmacy will process and have ready for patient 90ds $10.75    Attempted to reach patient. Two attempts and sounded like someone picked up but wouldn't answer. Patient is filling at a non preferred pharmacy, asked Alessio to fill medication.  90ds $10.75 per reconcile medication dispense report patient has been filling at [facility] since     Will send

## 2023-07-10 ENCOUNTER — HOSPITAL ENCOUNTER (OUTPATIENT)
Dept: GENERAL RADIOLOGY | Age: 88
Discharge: HOME | End: 2023-07-10
Payer: MEDICARE

## 2023-07-10 DIAGNOSIS — M54.2 NECK PAIN: ICD-10-CM

## 2023-07-10 DIAGNOSIS — M19.041 ARTHRITIS OF BOTH HANDS: ICD-10-CM

## 2023-07-10 DIAGNOSIS — M19.042 ARTHRITIS OF BOTH HANDS: ICD-10-CM

## 2023-07-10 PROCEDURE — 73130 X-RAY EXAM OF HAND: CPT

## 2023-07-10 PROCEDURE — 72040 X-RAY EXAM NECK SPINE 2-3 VW: CPT

## 2023-07-12 VITALS
SYSTOLIC BLOOD PRESSURE: 137 MMHG | WEIGHT: 193.2 LBS | OXYGEN SATURATION: 97 % | BODY MASS INDEX: 35.55 KG/M2 | HEART RATE: 64 BPM | DIASTOLIC BLOOD PRESSURE: 75 MMHG | HEIGHT: 62 IN

## 2023-07-21 ENCOUNTER — TELEPHONE (OUTPATIENT)
Dept: INTERNAL MEDICINE CLINIC | Age: 88
End: 2023-07-21

## 2023-07-21 NOTE — TELEPHONE ENCOUNTER
----- Message from Phil Monroe DO sent at 7/21/2023 10:03 AM EDT -----  Please call patient and let her know that I reviewed her labs. Everything came back looking okay. Her hemoglobin a1c the diabetes number is a little higher however still well controlled.

## 2023-09-01 ENCOUNTER — TELEPHONE (OUTPATIENT)
Dept: PHARMACY | Facility: CLINIC | Age: 88
End: 2023-09-01

## 2023-09-01 NOTE — TELEPHONE ENCOUNTER
[facility] CLINICAL PHARMACY: ADHERENCE REVIEW  Identified care gap per [employer]: fills at Non-preferred pharmacy: [facility]: ACE/ARB and Diabetes adherence        ASSESSMENT    ACE/ARB ADHERENCE    Insurance Records claims through 23 (Prior Year  [address] = not reported; YTD [address] = 76%; Potential Fail Date: 23):   LOSARTAN POT TAB 50MG last filled on 23 for 90 day supply. Next refill due: 23    Prescribed si tablet/capsule daily    Per Insurer Portal: last filled on 23 for 90 day supply. BP Readings from Last 3 Encounters:   23 137/75   23 136/84   08/10/22 (!) 162/73     Estimated Creatinine Clearance: 50 mL/min (based on SCr of 0.8 mg/dL). Lab Results   Component Value Date    CREATININE 0.8 2023     Lab Results   Component Value Date    K 4.5 2023     DIABETES ADHERENCE    Insurance Records claims through 23 (Prior Year  [address] = not reported; YTD PDC = 47% - FAILED): METFORMIN    TAB 500MG ER last filled on 23 for 90 day supply. Next refill due: 23    Prescribed si tablet/capsule daily    Per [facility]: unable to get through to the pharmacy or automated refill line    Lab Results   Component Value Date    LABA1C 6.6 2023    LABA1C 6.2 04/15/2023       PLAN  Per insurer report, LIS-0 - co-pays are based on tiers and patient is subject to coverage gap. The following are interventions that have been identified:   Patient overdue refilling metformin. Unsure if patient is still prescribed this medication. Patient filling at a non-preferred pharmacy  Patient failed  metformin for . Will send letter. Last Visit: 23  Next Visit: no future appt    For Pharmacy Admin Tracking Only    Program: [facility] in place:  No  Gap Closed?: No   Time Spent (min): [address] [facility] [facility].   [facility] free: [phone]

## 2023-10-12 ENCOUNTER — OFFICE VISIT (OUTPATIENT)
Dept: FAMILY MEDICINE CLINIC | Age: 88
End: 2023-10-12
Payer: MEDICARE

## 2023-10-12 VITALS
HEART RATE: 74 BPM | OXYGEN SATURATION: 98 % | HEIGHT: 63 IN | BODY MASS INDEX: 33.66 KG/M2 | WEIGHT: 190 LBS | SYSTOLIC BLOOD PRESSURE: 122 MMHG | DIASTOLIC BLOOD PRESSURE: 70 MMHG

## 2023-10-12 DIAGNOSIS — Z00.00 ENCOUNTER FOR MEDICAL EXAMINATION TO ESTABLISH CARE: ICD-10-CM

## 2023-10-12 DIAGNOSIS — C95.91 LEUKEMIA IN REMISSION, UNSPECIFIED LEUKEMIA TYPE (HCC): Primary | ICD-10-CM

## 2023-10-12 DIAGNOSIS — E11.9 TYPE 2 DIABETES MELLITUS WITHOUT COMPLICATION, WITHOUT LONG-TERM CURRENT USE OF INSULIN (HCC): ICD-10-CM

## 2023-10-12 PROBLEM — M19.90 OSTEOARTHRITIS: Status: ACTIVE | Noted: 2023-10-12

## 2023-10-12 LAB — HBA1C MFR BLD: 6.6 %

## 2023-10-12 PROCEDURE — 3044F HG A1C LEVEL LT 7.0%: CPT | Performed by: STUDENT IN AN ORGANIZED HEALTH CARE EDUCATION/TRAINING PROGRAM

## 2023-10-12 PROCEDURE — 1123F ACP DISCUSS/DSCN MKR DOCD: CPT | Performed by: STUDENT IN AN ORGANIZED HEALTH CARE EDUCATION/TRAINING PROGRAM

## 2023-10-12 PROCEDURE — 99202 OFFICE O/P NEW SF 15 MIN: CPT | Performed by: STUDENT IN AN ORGANIZED HEALTH CARE EDUCATION/TRAINING PROGRAM

## 2023-10-12 PROCEDURE — 83036 HEMOGLOBIN GLYCOSYLATED A1C: CPT | Performed by: STUDENT IN AN ORGANIZED HEALTH CARE EDUCATION/TRAINING PROGRAM

## 2023-10-12 RX ORDER — METFORMIN HYDROCHLORIDE 500 MG/1
500 TABLET, EXTENDED RELEASE ORAL DAILY
Qty: 90 TABLET | Refills: 0 | Status: SHIPPED | OUTPATIENT
Start: 2023-10-12 | End: 2024-01-10

## 2023-10-12 ASSESSMENT — ENCOUNTER SYMPTOMS
RHINORRHEA: 0
SHORTNESS OF BREATH: 0
ABDOMINAL PAIN: 0
SORE THROAT: 0

## 2023-10-12 NOTE — PROGRESS NOTES
Abdi Albarran is a 80 y. o.female who presents with   Chief Complaint   Patient presents with    New Patient   . Is an 80-year-old female with past medical history of high blood pressure and type 2 diabetes currently controlled on metformin alone. Presenting to establish care today. Was tearful today speaking about the death of her mother. However she reports that she normally is optimistic and upbeat. Denies depression. Review of Systems   Constitutional:  Negative for chills. HENT:  Negative for rhinorrhea and sore throat. Eyes:  Negative for visual disturbance. Respiratory:  Negative for shortness of breath. Cardiovascular:  Negative for chest pain. Gastrointestinal:  Negative for abdominal pain. Skin:  Negative for pallor. Neurological:  Negative for headaches. Past Medical History:   Diagnosis Date    Allergic rhinitis     Cancer ([address])     diverticuli     DVT (deep venous thrombosis) (HCC)     Hyperlipidemia     Hypertension     Leukemia (HCC)     Osteoarthritis     Skin cancer     Sleep apnea     Type 2 diabetes mellitus without complication ([address])        Past Surgical History:   Procedure Laterality Date    APPENDECTOMY      CHOLECYSTECTOMY      HYSTERECTOMY, TOTAL ABDOMINAL (CERVIX REMOVED)         Social History     Socioeconomic History    Marital status:       Spouse name: Not on file    Number of children: Not on file    Years of education: Not on file    Highest education level: Not on file   Occupational History    Not on file   Tobacco Use    Smoking status: Never    Smokeless tobacco: Never   Vaping Use    Vaping Use: Never used   Substance and Sexual Activity    Alcohol use: Not Currently    Drug use: Never    Sexual activity: Not Currently   Other Topics Concern    Not on file   Social History Narrative    Not on file     Social Determinants of Health     Financial Resource Strain: Low Risk  (4/6/2023)    Overall Financial Resource Strain (CARDIA)

## 2023-12-14 ENCOUNTER — OFFICE VISIT (OUTPATIENT)
Dept: FAMILY MEDICINE CLINIC | Age: 88
End: 2023-12-14
Payer: MEDICARE

## 2023-12-14 VITALS
HEART RATE: 97 BPM | DIASTOLIC BLOOD PRESSURE: 74 MMHG | OXYGEN SATURATION: 100 % | SYSTOLIC BLOOD PRESSURE: 128 MMHG | BODY MASS INDEX: 30.38 KG/M2 | WEIGHT: 168.8 LBS

## 2023-12-14 DIAGNOSIS — Z23 NEED FOR INFLUENZA VACCINATION: ICD-10-CM

## 2023-12-14 DIAGNOSIS — M19.90 ARTHRITIS: Primary | ICD-10-CM

## 2023-12-14 PROCEDURE — G0008 ADMIN INFLUENZA VIRUS VAC: HCPCS | Performed by: STUDENT IN AN ORGANIZED HEALTH CARE EDUCATION/TRAINING PROGRAM

## 2023-12-14 PROCEDURE — 99213 OFFICE O/P EST LOW 20 MIN: CPT | Performed by: STUDENT IN AN ORGANIZED HEALTH CARE EDUCATION/TRAINING PROGRAM

## 2023-12-14 PROCEDURE — 90694 VACC AIIV4 NO PRSRV 0.5ML IM: CPT | Performed by: STUDENT IN AN ORGANIZED HEALTH CARE EDUCATION/TRAINING PROGRAM

## 2023-12-14 PROCEDURE — 1123F ACP DISCUSS/DSCN MKR DOCD: CPT | Performed by: STUDENT IN AN ORGANIZED HEALTH CARE EDUCATION/TRAINING PROGRAM

## 2023-12-14 NOTE — PROGRESS NOTES
Evie Hewitt is a 80 y. o.female who presents with   Chief Complaint   Patient presents with    Follow-up   . Portions of this chart may have been created with voice recognition software. Occasional wrong-word or \"sound-alike\" substitutions may have occurred due to the inherent limitations of voice recognition software. Read the chart carefully and recognize, using context, where these substitutions have occurred    Patient presents for follow up. Patient reports that she is having some aches due to arthritis in her knees and in her hands. Patient states that she needs to complete 2 more exercise classes per her insurance prior to being able to obtain gel injections in her knees. However, she does report that she has been having some more pain in her hands and it is painful to close them. Patient amenable to pharmacologic therapy if necessary. Review of Systems   Constitutional:  Negative for chills and fatigue. HENT:  Negative for rhinorrhea and sore throat. Eyes:  Negative for visual disturbance. Respiratory:  Negative for cough and shortness of breath. Cardiovascular:  Negative for chest pain. Gastrointestinal:  Negative for abdominal pain and nausea. Musculoskeletal:  Positive for arthralgias. Negative for myalgias. Skin:  Negative for pallor and rash. Neurological:  Negative for dizziness, light-headedness and headaches. Past Medical History:   Diagnosis Date    Allergic rhinitis     Cancer ([address])     diverticuli     DVT (deep venous thrombosis) (HCC)     Hyperlipidemia     Hypertension     Leukemia (HCC)     Osteoarthritis     Skin cancer     Sleep apnea     Type 2 diabetes mellitus without complication ([address])        Past Surgical History:   Procedure Laterality Date    APPENDECTOMY      CHOLECYSTECTOMY      HYSTERECTOMY, TOTAL ABDOMINAL (CERVIX REMOVED)         Social History     Socioeconomic History    Marital status:       Spouse name: Not on file    Number of

## 2024-02-02 DIAGNOSIS — E11.9 TYPE 2 DIABETES MELLITUS WITHOUT COMPLICATION, WITHOUT LONG-TERM CURRENT USE OF INSULIN (HCC): ICD-10-CM

## 2024-02-02 RX ORDER — METFORMIN HYDROCHLORIDE 500 MG/1
500 TABLET, EXTENDED RELEASE ORAL DAILY
Qty: 90 TABLET | Refills: 0 | Status: SHIPPED | OUTPATIENT
Start: 2024-02-02

## 2024-03-22 RX ORDER — LOSARTAN POTASSIUM 50 MG/1
50 TABLET ORAL EVERY MORNING
Qty: 90 TABLET | Refills: 0 | Status: SHIPPED | OUTPATIENT
Start: 2024-03-22 | End: 2024-06-20

## 2024-07-11 DIAGNOSIS — E11.9 TYPE 2 DIABETES MELLITUS WITHOUT COMPLICATION, WITHOUT LONG-TERM CURRENT USE OF INSULIN (HCC): Primary | ICD-10-CM

## 2024-07-11 RX ORDER — LOSARTAN POTASSIUM 50 MG/1
50 TABLET ORAL EVERY MORNING
Qty: 30 TABLET | Refills: 0 | Status: SHIPPED | OUTPATIENT
Start: 2024-07-11 | End: 2024-08-10

## 2024-07-11 RX ORDER — LOSARTAN POTASSIUM 50 MG/1
50 TABLET ORAL EVERY MORNING
Qty: 90 TABLET | Refills: 0 | OUTPATIENT
Start: 2024-07-11

## 2024-08-06 ENCOUNTER — TELEPHONE (OUTPATIENT)
Dept: FAMILY MEDICINE CLINIC | Age: 89
End: 2024-08-06

## 2024-08-06 DIAGNOSIS — G47.33 OSA (OBSTRUCTIVE SLEEP APNEA): Primary | ICD-10-CM

## 2024-08-06 NOTE — TELEPHONE ENCOUNTER
Pt called stating she has used a Bipap breathing machine for years. She recently moved it from one area of her house to another and accidentally dropped it. She reached out to [facility] where she gets her medical equipment from and they told her she is eligible for a replacement but they need a script from her PCP.     [facility]:   Fax: [phone]

## 2024-08-29 ENCOUNTER — OFFICE VISIT (OUTPATIENT)
Dept: FAMILY MEDICINE CLINIC | Age: 89
End: 2024-08-29
Payer: MEDICARE

## 2024-08-29 ENCOUNTER — TELEPHONE (OUTPATIENT)
Dept: PHARMACY | Facility: CLINIC | Age: 89
End: 2024-08-29

## 2024-08-29 VITALS
OXYGEN SATURATION: 98 % | BODY MASS INDEX: 30.48 KG/M2 | WEIGHT: 172 LBS | DIASTOLIC BLOOD PRESSURE: 68 MMHG | HEIGHT: 63 IN | HEART RATE: 73 BPM | SYSTOLIC BLOOD PRESSURE: 118 MMHG

## 2024-08-29 DIAGNOSIS — M19.91 PRIMARY OSTEOARTHRITIS, UNSPECIFIED SITE: ICD-10-CM

## 2024-08-29 DIAGNOSIS — I10 PRIMARY HYPERTENSION: ICD-10-CM

## 2024-08-29 DIAGNOSIS — Z00.00 MEDICARE ANNUAL WELLNESS VISIT, SUBSEQUENT: Primary | ICD-10-CM

## 2024-08-29 DIAGNOSIS — E11.9 TYPE 2 DIABETES MELLITUS WITHOUT COMPLICATION, WITHOUT LONG-TERM CURRENT USE OF INSULIN (HCC): ICD-10-CM

## 2024-08-29 DIAGNOSIS — E66.09 CLASS 2 OBESITY DUE TO EXCESS CALORIES WITHOUT SERIOUS COMORBIDITY WITH BODY MASS INDEX (BMI) OF 35.0 TO 35.9 IN ADULT: ICD-10-CM

## 2024-08-29 DIAGNOSIS — R53.83 LOW ENERGY: ICD-10-CM

## 2024-08-29 PROCEDURE — 96372 THER/PROPH/DIAG INJ SC/IM: CPT | Performed by: STUDENT IN AN ORGANIZED HEALTH CARE EDUCATION/TRAINING PROGRAM

## 2024-08-29 PROCEDURE — G0439 PPPS, SUBSEQ VISIT: HCPCS | Performed by: STUDENT IN AN ORGANIZED HEALTH CARE EDUCATION/TRAINING PROGRAM

## 2024-08-29 PROCEDURE — 1123F ACP DISCUSS/DSCN MKR DOCD: CPT | Performed by: STUDENT IN AN ORGANIZED HEALTH CARE EDUCATION/TRAINING PROGRAM

## 2024-08-29 RX ORDER — CYANOCOBALAMIN 1000 UG/ML
1000 INJECTION, SOLUTION INTRAMUSCULAR; SUBCUTANEOUS ONCE
Status: COMPLETED | OUTPATIENT
Start: 2024-08-29 | End: 2024-08-29

## 2024-08-29 RX ADMIN — CYANOCOBALAMIN 1000 MCG: 1000 INJECTION, SOLUTION INTRAMUSCULAR; SUBCUTANEOUS at 17:05

## 2024-08-29 ASSESSMENT — PATIENT HEALTH QUESTIONNAIRE - PHQ9
SUM OF ALL RESPONSES TO PHQ9 QUESTIONS 1 & 2: 2
1. LITTLE INTEREST OR PLEASURE IN DOING THINGS: SEVERAL DAYS
SUM OF ALL RESPONSES TO PHQ QUESTIONS 1-9: 2
2. FEELING DOWN, DEPRESSED OR HOPELESS: SEVERAL DAYS

## 2024-08-29 ASSESSMENT — LIFESTYLE VARIABLES
HOW MANY STANDARD DRINKS CONTAINING ALCOHOL DO YOU HAVE ON A TYPICAL DAY: PATIENT DOES NOT DRINK
HOW OFTEN DO YOU HAVE A DRINK CONTAINING ALCOHOL: NEVER

## 2024-08-29 NOTE — TELEPHONE ENCOUNTER
[facility] CLINICAL PHARMACY: ADHERENCE REVIEW  Identified care gap per [employer]: fills at [facility]: ACE/ARB adherence    Patient also appears to be prescribed: Diabetes    ASSESSMENT    ACE/ARB ADHERENCE    Insurance Records claims through 8/10/24 (Prior Year PDC = not reported; YTD PDC = 78%; Potential Fail Date: 24):   LOSARTAN POT TAB 50MG  last filled on 24 for 90 day supply. Next refill due: 24    Prescribed si tablet/capsule daily    Per Insurer Portal: last filled on same    Per [facility]: RX has .    BP Readings from Last 3 Encounters:   23 128/74   10/12/23 122/70   23 137/75     CrCl cannot be calculated (Patient's most recent lab result is older than the maximum 180 days allowed.).  Lab Results   Component Value Date    CREATININE 0.8 2023     Lab Results   Component Value Date    K 4.5 2023       The following are interventions that have been identified:   Patient OVERDUE refilling Losartan and active on home medication list.   Patient NEEDS REFILLS for Losartan    Attempting to reach patient to review.  Left message asking for return call. Letter sent to patient.      Last Visit: 23  Next Visit: 24      Tania Sorto CPhT  [facility] Clinical   [facility]  Toll Free: [phone] Option 1    For Pharmacy Admin Tracking Only    Program: [employer]  CPA in place:  No  Gap Closed?: No   Time Spent (min): 10

## 2024-08-29 NOTE — PROGRESS NOTES
Cathy Elizabeth is a 89 y.o.female who presents with   Chief Complaint   Patient presents with    Medicare AWV   Portions of this chart may have been created with voice recognition software. Occasional wrong-word or \"sound-alike\" substitutions may have occurred due to the inherent limitations of voice recognition software. Read the chart carefully and recognize, using context, where these substitutions have occurred      Pt presents for medicare wellness visit. See below. No complaints today.         Review of Systems   Constitutional:  Negative for fatigue.   Eyes:  Negative for visual disturbance.   Respiratory:  Negative for shortness of breath.    Cardiovascular:  Negative for chest pain.   Gastrointestinal:  Negative for abdominal pain.   Skin:  Negative for rash.   Neurological:  Negative for dizziness, light-headedness and headaches.        Past Medical History:   Diagnosis Date    Allergic rhinitis     Cancer (HCC)     diverticuli     DVT (deep venous thrombosis) (HCC)     Hyperlipidemia     Hypertension     Leukemia (HCC)     Osteoarthritis     Skin cancer     Sleep apnea     Type 2 diabetes mellitus without complication (HCC)        Past Surgical History:   Procedure Laterality Date    APPENDECTOMY      CHOLECYSTECTOMY      HYSTERECTOMY, TOTAL ABDOMINAL (CERVIX REMOVED)         Social History     Socioeconomic History    Marital status:      Spouse name: Not on file    Number of children: Not on file    Years of education: Not on file    Highest education level: Not on file   Occupational History    Not on file   Tobacco Use    Smoking status: Never    Smokeless tobacco: Never   Vaping Use    Vaping status: Never Used   Substance and Sexual Activity    Alcohol use: Not Currently    Drug use: Never    Sexual activity: Not Currently   Other Topics Concern    Not on file   Social History Narrative    Not on file     Social Determinants of Health     Financial Resource Strain: Low Risk  (4/6/2023)

## 2024-08-30 ENCOUNTER — TELEPHONE (OUTPATIENT)
Dept: FAMILY MEDICINE CLINIC | Age: 89
End: 2024-08-30

## 2024-08-30 NOTE — TELEPHONE ENCOUNTER
Pt was told that [employer] needs more information to get insurance to cover the BiPAP machine. [facility] Phone #: [phone]

## 2024-09-03 DIAGNOSIS — G47.33 OSA (OBSTRUCTIVE SLEEP APNEA): Primary | ICD-10-CM

## 2024-09-03 ASSESSMENT — ENCOUNTER SYMPTOMS
ABDOMINAL PAIN: 0
SHORTNESS OF BREATH: 0

## 2024-09-05 PROBLEM — E11.9 TYPE 2 DIABETES MELLITUS WITHOUT COMPLICATION, WITHOUT LONG-TERM CURRENT USE OF INSULIN (HCC): Chronic | Status: ACTIVE | Noted: 2023-04-06

## 2024-09-05 PROBLEM — I10 PRIMARY HYPERTENSION: Chronic | Status: ACTIVE | Noted: 2023-04-06

## 2024-09-05 PROBLEM — G47.33 OBSTRUCTIVE SLEEP APNEA SYNDROME: Status: ACTIVE | Noted: 2024-09-05

## 2024-09-05 PROBLEM — E66.811 CLASS 1 OBESITY DUE TO EXCESS CALORIES WITH SERIOUS COMORBIDITY AND BODY MASS INDEX (BMI) OF 31.0 TO 31.9 IN ADULT: Chronic | Status: ACTIVE | Noted: 2023-04-06

## 2024-09-05 PROBLEM — E66.09 CLASS 1 OBESITY DUE TO EXCESS CALORIES WITH SERIOUS COMORBIDITY AND BODY MASS INDEX (BMI) OF 31.0 TO 31.9 IN ADULT: Chronic | Status: ACTIVE | Noted: 2023-04-06

## 2024-09-20 ENCOUNTER — HOSPITAL ENCOUNTER (OUTPATIENT)
Dept: SLEEP CENTER | Age: 89
Discharge: HOME | End: 2024-09-20
Payer: MEDICARE

## 2024-09-20 DIAGNOSIS — G47.33 OBSTRUCTIVE SLEEP APNEA SYNDROME: ICD-10-CM

## 2024-09-20 PROCEDURE — 95806 SLEEP STUDY UNATT&RESP EFFT: CPT

## 2024-09-25 ENCOUNTER — TELEPHONE (OUTPATIENT)
Dept: SLEEP CENTER | Age: 89
End: 2024-09-25

## 2024-10-03 ENCOUNTER — NURSE ONLY (OUTPATIENT)
Dept: FAMILY MEDICINE CLINIC | Age: 89
End: 2024-10-03
Payer: MEDICARE

## 2024-10-03 DIAGNOSIS — E53.8 B12 DEFICIENCY: Primary | ICD-10-CM

## 2024-10-03 DIAGNOSIS — E11.9 TYPE 2 DIABETES MELLITUS WITHOUT COMPLICATION, WITHOUT LONG-TERM CURRENT USE OF INSULIN (HCC): ICD-10-CM

## 2024-10-03 PROCEDURE — 96372 THER/PROPH/DIAG INJ SC/IM: CPT | Performed by: STUDENT IN AN ORGANIZED HEALTH CARE EDUCATION/TRAINING PROGRAM

## 2024-10-03 RX ORDER — CYANOCOBALAMIN 1000 UG/ML
1000 INJECTION, SOLUTION INTRAMUSCULAR; SUBCUTANEOUS ONCE
Status: COMPLETED | OUTPATIENT
Start: 2024-10-03 | End: 2024-10-03

## 2024-10-03 RX ADMIN — CYANOCOBALAMIN 1000 MCG: 1000 INJECTION, SOLUTION INTRAMUSCULAR; SUBCUTANEOUS at 15:37

## 2024-10-04 LAB
ALBUMIN SERPL-MCNC: 4.1 G/DL (ref 3.4–5)
ALBUMIN/GLOB SERPL: 2 {RATIO} (ref 1.1–2.2)
ALP SERPL-CCNC: 89 U/L (ref 40–129)
ALT SERPL-CCNC: 11 U/L (ref 10–40)
ANION GAP SERPL CALCULATED.3IONS-SCNC: 12 MMOL/L (ref 3–16)
AST SERPL-CCNC: 14 U/L (ref 15–37)
BASOPHILS # BLD: 0.1 K/UL (ref 0–0.2)
BASOPHILS NFR BLD: 0.6 %
BILIRUB SERPL-MCNC: 0.3 MG/DL (ref 0–1)
BUN SERPL-MCNC: 24 MG/DL (ref 7–20)
CALCIUM SERPL-MCNC: 9.5 MG/DL (ref 8.3–10.6)
CHLORIDE SERPL-SCNC: 105 MMOL/L (ref 99–110)
CHOLEST SERPL-MCNC: 194 MG/DL (ref 0–199)
CO2 SERPL-SCNC: 26 MMOL/L (ref 21–32)
CREAT SERPL-MCNC: 0.8 MG/DL (ref 0.6–1.2)
DEPRECATED RDW RBC AUTO: 18.8 % (ref 12.4–15.4)
EOSINOPHIL # BLD: 0.2 K/UL (ref 0–0.6)
EOSINOPHIL NFR BLD: 2.2 %
EST. AVERAGE GLUCOSE BLD GHB EST-MCNC: 119.8 MG/DL
GFR SERPLBLD CREATININE-BSD FMLA CKD-EPI: 70 ML/MIN/{1.73_M2}
GLUCOSE SERPL-MCNC: 84 MG/DL (ref 70–99)
HBA1C MFR BLD: 5.8 %
HCT VFR BLD AUTO: 40.2 % (ref 36–48)
HDLC SERPL-MCNC: 57 MG/DL (ref 40–60)
HGB BLD-MCNC: 12.9 G/DL (ref 12–16)
LDLC SERPL CALC-MCNC: 119 MG/DL
LYMPHOCYTES # BLD: 4 K/UL (ref 1–5.1)
LYMPHOCYTES NFR BLD: 38.6 %
MCH RBC QN AUTO: 26.1 PG (ref 26–34)
MCHC RBC AUTO-ENTMCNC: 32 G/DL (ref 31–36)
MCV RBC AUTO: 81.6 FL (ref 80–100)
MONOCYTES # BLD: 0.6 K/UL (ref 0–1.3)
MONOCYTES NFR BLD: 5.4 %
NEUTROPHILS # BLD: 5.6 K/UL (ref 1.7–7.7)
NEUTROPHILS NFR BLD: 53.2 %
PLATELET # BLD AUTO: 269 K/UL (ref 135–450)
PMV BLD AUTO: 8.3 FL (ref 5–10.5)
POTASSIUM SERPL-SCNC: 4.5 MMOL/L (ref 3.5–5.1)
PROT SERPL-MCNC: 6.2 G/DL (ref 6.4–8.2)
RBC # BLD AUTO: 4.93 M/UL (ref 4–5.2)
SODIUM SERPL-SCNC: 143 MMOL/L (ref 136–145)
TRIGL SERPL-MCNC: 89 MG/DL (ref 0–150)
VLDLC SERPL CALC-MCNC: 18 MG/DL
WBC # BLD AUTO: 10.5 K/UL (ref 4–11)

## 2024-10-05 DIAGNOSIS — E11.9 TYPE 2 DIABETES MELLITUS WITHOUT COMPLICATION, WITHOUT LONG-TERM CURRENT USE OF INSULIN (HCC): ICD-10-CM

## 2024-10-07 RX ORDER — METFORMIN HCL 500 MG
500 TABLET, EXTENDED RELEASE 24 HR ORAL DAILY
Qty: 90 TABLET | Refills: 0 | Status: SHIPPED | OUTPATIENT
Start: 2024-10-07

## 2024-10-10 DIAGNOSIS — R79.9 ABNORMAL BLOOD CHEMISTRY: Primary | ICD-10-CM

## 2024-10-31 ENCOUNTER — TELEPHONE (OUTPATIENT)
Dept: PHARMACY | Facility: CLINIC | Age: 89
End: 2024-10-31

## 2024-10-31 NOTE — TELEPHONE ENCOUNTER
[facility] CLINICAL PHARMACY: ADHERENCE REVIEW  Identified care gap per [employer]: fills at [facility]: ACE/ARB and Diabetes adherence    ASSESSMENT    ACE/ARB ADHERENCE    Insurance Records claims through 10/21/24 (Prior Year PDC = 73% - FAILED; YTD PDC = 55% - FAILED):   LOSARTAN POT TAB 50MG last filled on 10/9/24 for 30 day supply. Next refill due: 24    Prescribed si tablet/capsule daily    BP Readings from Last 3 Encounters:   24 110/60   24 118/68   23 128/74     Estimated Creatinine Clearance: 47 mL/min (based on SCr of 0.8 mg/dL).  Lab Results   Component Value Date    CREATININE 0.8 10/03/2024     Lab Results   Component Value Date    K 4.5 10/03/2024     DIABETES ADHERENCE    Insurance Records claims through 10/21/24 (Prior Year PDC = not reported; YTD PDC = FIRST FILL; Potential Fail Date: 24):   METFORMIN    TAB 500MG ER last filled on 7/3/24 for 90 day supply. Next refill due: 10/1/24    Prescribed si tablet/capsule daily    Per Reconcile Dispense History:   Dispensed Days Supply Quantity Provider Pharmacy   METFORMIN HCL  MG TABLET 2024 90 90 each Elda Eddy MD [facility]/pharmacy #[id number] - C...   METFORMIN HCL  MG TABLET 2023 90 90 each Elda Eddy MD [facility]/pharmacy #[id number] - C...   METFORMIN ER 500MG GP TAB 2023 90 90 tablet RAJ ABAD [facility] #...   METFORMIN HYDROCHLORIDE ER  500 MG  90 90 tablet VIANEY MERCADO [facility] #...     Lab Results   Component Value Date    LABA1C 5.8 10/03/2024    LABA1C 6.6 10/12/2023    LABA1C 6.6 2023     PLAN  The following are interventions that have been identified:   Patient eligible for 90 day supply of rxs (last filled 30ds losartan)  Need to confirm if patient is taking:  Metformin ER 500mg daily? (8 months between 90ds fills; last A1c 5.8 - if not taking regularly, consider review with PCP if trial off?)  Losartan 50mg daily? (73% PDC last year, 55% PDC

## 2024-12-11 ENCOUNTER — OFFICE VISIT (OUTPATIENT)
Dept: FAMILY MEDICINE CLINIC | Age: 88
End: 2024-12-11
Payer: MEDICARE

## 2024-12-11 VITALS
HEART RATE: 72 BPM | DIASTOLIC BLOOD PRESSURE: 60 MMHG | HEIGHT: 63 IN | BODY MASS INDEX: 31.82 KG/M2 | WEIGHT: 179.6 LBS | SYSTOLIC BLOOD PRESSURE: 142 MMHG | OXYGEN SATURATION: 99 %

## 2024-12-11 DIAGNOSIS — M17.12 ARTHRITIS OF KNEE, LEFT: ICD-10-CM

## 2024-12-11 DIAGNOSIS — C95.91 LEUKEMIA IN REMISSION, UNSPECIFIED LEUKEMIA TYPE (HCC): Primary | ICD-10-CM

## 2024-12-11 DIAGNOSIS — E11.9 TYPE 2 DIABETES MELLITUS WITHOUT COMPLICATION, WITHOUT LONG-TERM CURRENT USE OF INSULIN (HCC): ICD-10-CM

## 2024-12-11 DIAGNOSIS — E53.8 B12 DEFICIENCY: ICD-10-CM

## 2024-12-11 PROCEDURE — 96372 THER/PROPH/DIAG INJ SC/IM: CPT | Performed by: STUDENT IN AN ORGANIZED HEALTH CARE EDUCATION/TRAINING PROGRAM

## 2024-12-11 PROCEDURE — 99214 OFFICE O/P EST MOD 30 MIN: CPT | Performed by: STUDENT IN AN ORGANIZED HEALTH CARE EDUCATION/TRAINING PROGRAM

## 2024-12-11 PROCEDURE — 3044F HG A1C LEVEL LT 7.0%: CPT | Performed by: STUDENT IN AN ORGANIZED HEALTH CARE EDUCATION/TRAINING PROGRAM

## 2024-12-11 PROCEDURE — 1123F ACP DISCUSS/DSCN MKR DOCD: CPT | Performed by: STUDENT IN AN ORGANIZED HEALTH CARE EDUCATION/TRAINING PROGRAM

## 2024-12-11 PROCEDURE — 1159F MED LIST DOCD IN RCRD: CPT | Performed by: STUDENT IN AN ORGANIZED HEALTH CARE EDUCATION/TRAINING PROGRAM

## 2024-12-11 RX ORDER — LOSARTAN POTASSIUM 50 MG/1
50 TABLET ORAL EVERY MORNING
Qty: 30 TABLET | Refills: 0 | Status: SHIPPED | OUTPATIENT
Start: 2024-12-11 | End: 2025-01-10

## 2024-12-11 RX ORDER — METFORMIN HYDROCHLORIDE 500 MG/1
500 TABLET, EXTENDED RELEASE ORAL DAILY
Qty: 90 TABLET | Refills: 0 | Status: SHIPPED | OUTPATIENT
Start: 2024-12-11

## 2024-12-11 RX ORDER — VIT C/E/ZN/COPPR/LUTEIN/ZEAXAN 250MG-90MG
CAPSULE ORAL 2 TIMES DAILY
COMMUNITY

## 2024-12-11 RX ORDER — CYANOCOBALAMIN 1000 UG/ML
1000 INJECTION, SOLUTION INTRAMUSCULAR; SUBCUTANEOUS ONCE
Status: COMPLETED | OUTPATIENT
Start: 2024-12-11 | End: 2024-12-11

## 2024-12-11 RX ADMIN — CYANOCOBALAMIN 1000 MCG: 1000 INJECTION, SOLUTION INTRAMUSCULAR; SUBCUTANEOUS at 15:15

## 2024-12-11 SDOH — ECONOMIC STABILITY: FOOD INSECURITY: WITHIN THE PAST 12 MONTHS, THE FOOD YOU BOUGHT JUST DIDN'T LAST AND YOU DIDN'T HAVE MONEY TO GET MORE.: NEVER TRUE

## 2024-12-11 SDOH — ECONOMIC STABILITY: FOOD INSECURITY: WITHIN THE PAST 12 MONTHS, YOU WORRIED THAT YOUR FOOD WOULD RUN OUT BEFORE YOU GOT MONEY TO BUY MORE.: NEVER TRUE

## 2024-12-11 SDOH — ECONOMIC STABILITY: INCOME INSECURITY: HOW HARD IS IT FOR YOU TO PAY FOR THE VERY BASICS LIKE FOOD, HOUSING, MEDICAL CARE, AND HEATING?: NOT HARD AT ALL

## 2024-12-11 NOTE — PROGRESS NOTES
Cathy Elizaebth is a 89 y.o.female who presents with   Chief Complaint   Patient presents with    3 Month Follow-Up     Here for follow up to discuss medications and get a B12 shot      Patient presents for B12 shot today. Would like me to order a wheelchair so she can visit her son more often as they are having this requirement for her.   Needs Losartan and Metformin refill. Denies complaints or concerns today.        Review of Systems   Constitutional:  Negative for fatigue.   Eyes:  Negative for visual disturbance.   Respiratory:  Negative for shortness of breath.    Cardiovascular:  Negative for chest pain.   Gastrointestinal:  Negative for abdominal pain.   Skin:  Negative for rash.   Neurological:  Negative for dizziness, light-headedness and headaches.        Past Medical History:   Diagnosis Date    Allergic rhinitis     Cancer (HCC)     diverticuli     DVT (deep venous thrombosis) (HCC)     Hyperlipidemia     Hypertension     Leukemia (HCC)     Osteoarthritis     Skin cancer     Sleep apnea     Type 2 diabetes mellitus without complication (HCC)        Past Surgical History:   Procedure Laterality Date    APPENDECTOMY      CHOLECYSTECTOMY      HYSTERECTOMY, TOTAL ABDOMINAL (CERVIX REMOVED)         Social History     Socioeconomic History    Marital status:      Spouse name: Not on file    Number of children: Not on file    Years of education: Not on file    Highest education level: Not on file   Occupational History    Not on file   Tobacco Use    Smoking status: Never    Smokeless tobacco: Never   Vaping Use    Vaping status: Never Used   Substance and Sexual Activity    Alcohol use: Not Currently    Drug use: Never    Sexual activity: Not Currently   Other Topics Concern    Not on file   Social History Narrative    Not on file     Social Determinants of Health     Financial Resource Strain: Low Risk  (12/11/2024)    Overall Financial Resource Strain (CARDIA)     Difficulty of Paying Living Expenses:

## 2024-12-11 NOTE — PROGRESS NOTES
Administrations This Visit       cyanocobalamin injection 1,000 mcg       Admin Date  12/11/2024  15:15 Action  Given Dose  1,000 mcg Route  IntraMUSCular Site  Deltoid Left Documented By  Ely Coppola MA    NDC: 4956-3051-57    : [employer]    Patient Supplied?: No

## 2024-12-17 ASSESSMENT — ENCOUNTER SYMPTOMS
SHORTNESS OF BREATH: 0
ABDOMINAL PAIN: 0

## 2025-03-17 RX ORDER — LOSARTAN POTASSIUM 50 MG/1
50 TABLET ORAL EVERY MORNING
Qty: 30 TABLET | Refills: 0 | Status: SHIPPED | OUTPATIENT
Start: 2025-03-17 | End: 2025-04-16

## 2025-03-17 NOTE — TELEPHONE ENCOUNTER
Requested Prescriptions     Pending Prescriptions Disp Refills    losartan (COZAAR) 50 MG tablet 30 tablet 0     Sig: Take 1 tablet by mouth every morning      Last Visit: 12/11/24    Last Labs: 10/3/24    Next Visit:None

## 2025-04-21 RX ORDER — LOSARTAN POTASSIUM 50 MG/1
50 TABLET ORAL EVERY MORNING
Qty: 30 TABLET | Refills: 0 | Status: SHIPPED | OUTPATIENT
Start: 2025-04-21

## 2025-06-09 RX ORDER — LOSARTAN POTASSIUM 50 MG/1
50 TABLET ORAL EVERY MORNING
Qty: 90 TABLET | Refills: 1 | Status: SHIPPED | OUTPATIENT
Start: 2025-06-09

## 2025-06-18 DIAGNOSIS — E11.9 TYPE 2 DIABETES MELLITUS WITHOUT COMPLICATION, WITHOUT LONG-TERM CURRENT USE OF INSULIN (HCC): ICD-10-CM

## 2025-06-19 RX ORDER — METFORMIN HYDROCHLORIDE 500 MG/1
500 TABLET, EXTENDED RELEASE ORAL DAILY
Qty: 90 TABLET | Refills: 0 | Status: SHIPPED | OUTPATIENT
Start: 2025-06-19

## 2025-06-26 ENCOUNTER — TELEPHONE (OUTPATIENT)
Dept: PHARMACY | Facility: CLINIC | Age: 89
End: 2025-06-26

## 2025-06-26 DIAGNOSIS — E11.9 TYPE 2 DIABETES MELLITUS WITHOUT COMPLICATION, WITHOUT LONG-TERM CURRENT USE OF INSULIN (HCC): ICD-10-CM

## 2025-06-26 NOTE — TELEPHONE ENCOUNTER
[facility] CLINICAL PHARMACY: ADHERENCE REVIEW  Identified care gap per [employer]: fills at [facility]: ACE/ARB and Diabetes adherence  ASSESSMENT    ACE/ARB ADHERENCE    Insurance Records claims through 25 (Prior Year PDC = 54% - FAILED; YTD PDC = 72%; Potential Fail Date: 25):   LOSARTAN POT TAB 50MG last filled on 25 for 30 day supply. Next refill due: 25    Prescribed si tablet/capsule daily    Per Insurer Portal: last filled on 25 for 30 day supply    Per [facility]: will get  90 day supply ready to  since past due.    BP Readings from Last 3 Encounters:   24 (!) 142/60   24 110/60   24 118/68     CrCl cannot be calculated (Patient's most recent lab result is older than the maximum 180 days allowed.).  Lab Results   Component Value Date    CREATININE 0.8 10/03/2024     Lab Results   Component Value Date    K 4.5 10/03/2024     DIABETES ADHERENCE    Insurance Records claims through 25 (Prior Year PDC = not reported; YTD PDC = 100%; Potential Fail Date: 25):   METFORMIN  TAB 500MG ER last filled on 25 for 90 day supply. Next refill due: 25    Prescribed si tablet/capsule twice daily    Per Insurer Portal: last filled on same    Per [facility]: will get  90 day supply ready to  since past due.    Lab Results   Component Value Date    LABA1C 5.8 10/03/2024    LABA1C 6.6 10/12/2023    LABA1C 6.6 2023     NOTE: A1c not yet completed this calendar year      The following are interventions that have been identified:   Patient OVERDUE refilling METFORMIN    TAB 500MG ER and LOSARTAN POT TAB 50MG  and active on home medication list.     Attempting to reach patient to review - unable to leave message. Letter sent to patient.    Last Visit: 24  Next Visit: none    Tania Gerber CPhT  Population Health Clinical   [facility]  Toll Free: [phone] Option 1    For Pharmacy

## 2025-06-27 RX ORDER — METFORMIN HYDROCHLORIDE 500 MG/1
500 TABLET, EXTENDED RELEASE ORAL DAILY
Qty: 90 TABLET | Refills: 0 | OUTPATIENT
Start: 2025-06-27

## 2025-07-24 ENCOUNTER — OFFICE VISIT (OUTPATIENT)
Dept: FAMILY MEDICINE CLINIC | Age: 89
End: 2025-07-24

## 2025-07-24 VITALS
BODY MASS INDEX: 30.58 KG/M2 | OXYGEN SATURATION: 98 % | WEIGHT: 172.6 LBS | HEART RATE: 63 BPM | DIASTOLIC BLOOD PRESSURE: 80 MMHG | HEIGHT: 63 IN | SYSTOLIC BLOOD PRESSURE: 120 MMHG

## 2025-07-24 DIAGNOSIS — Z12.83 ENCOUNTER FOR SCREENING FOR MALIGNANT NEOPLASM OF SKIN: Primary | ICD-10-CM

## 2025-07-24 DIAGNOSIS — L65.9 ALOPECIA: ICD-10-CM

## 2025-07-24 DIAGNOSIS — E53.8 B12 DEFICIENCY: ICD-10-CM

## 2025-07-24 DIAGNOSIS — E11.9 TYPE 2 DIABETES MELLITUS WITHOUT COMPLICATION, WITHOUT LONG-TERM CURRENT USE OF INSULIN (HCC): ICD-10-CM

## 2025-07-24 DIAGNOSIS — I10 PRIMARY HYPERTENSION: ICD-10-CM

## 2025-07-24 DIAGNOSIS — L82.1 SEBORRHEIC KERATOSIS: ICD-10-CM

## 2025-07-24 DIAGNOSIS — C95.91 LEUKEMIA IN REMISSION, UNSPECIFIED LEUKEMIA TYPE (HCC): ICD-10-CM

## 2025-07-24 RX ORDER — CYANOCOBALAMIN 1000 UG/ML
1000 INJECTION, SOLUTION INTRAMUSCULAR; SUBCUTANEOUS ONCE
Status: COMPLETED | OUTPATIENT
Start: 2025-07-24 | End: 2025-07-24

## 2025-07-24 RX ADMIN — CYANOCOBALAMIN 1000 MCG: 1000 INJECTION, SOLUTION INTRAMUSCULAR; SUBCUTANEOUS at 12:14

## 2025-07-24 SDOH — ECONOMIC STABILITY: FOOD INSECURITY: WITHIN THE PAST 12 MONTHS, YOU WORRIED THAT YOUR FOOD WOULD RUN OUT BEFORE YOU GOT MONEY TO BUY MORE.: NEVER TRUE

## 2025-07-24 SDOH — ECONOMIC STABILITY: FOOD INSECURITY: WITHIN THE PAST 12 MONTHS, THE FOOD YOU BOUGHT JUST DIDN'T LAST AND YOU DIDN'T HAVE MONEY TO GET MORE.: NEVER TRUE

## 2025-07-24 ASSESSMENT — PATIENT HEALTH QUESTIONNAIRE - PHQ9
2. FEELING DOWN, DEPRESSED OR HOPELESS: NOT AT ALL
SUM OF ALL RESPONSES TO PHQ QUESTIONS 1-9: 0
1. LITTLE INTEREST OR PLEASURE IN DOING THINGS: NOT AT ALL
SUM OF ALL RESPONSES TO PHQ QUESTIONS 1-9: 0

## 2025-07-24 NOTE — PROGRESS NOTES
Cathy Elizabeth is a 89 y.o.female who presents with   Chief Complaint   Patient presents with    Cerumen Impaction     Left ear- over the last month has been getting worse and affecting her hearing- also said she got some water in her ear as well   .    History of Present Illness  The patient presents for evaluation of cerumen impaction, bilateral knee osteoarthritis, vitamin B12 deficiency, hair loss, skin tags, seborrheic keratosis, diabetes mellitus, and hypertension. She is accompanied by her sister.    She reports experiencing significant discomfort in her ear, which she attributes to an accumulation of wax. This has been causing hearing difficulties. The issue appears to be chronic, with the wax buildup occurring over time. She suspects that water may have entered her ear, exacerbating the problem.    She has been diagnosed with bone-on-bone arthritis in both knees but has not undergone surgery due to her leukemia diagnosis. She is under the care of a specialist and receives injections in her knees for cushioning. Her sister, a therapist, has suggested the use of a knee brace to prevent the bones from rubbing against each other. She is considering more frequent visits to her doctor, every 3 to 4 months, instead of the current 6-month interval.    She is also contemplating the need for monthly B12 injections. She used to receive these regularly when she lived in [place] but has since stopped. She is concerned about the effectiveness of oral vitamins in older adults.    She experienced hair loss following \"an overdose of anesthesia\" during surgery >40 years ago. Her hair has not fully regrown since then. She recently started taking biotin 10,000 mcg to promote hair growth. She is not currently taking a multivitamin.    She has noticed the development of bumps on her forehead and under her breasts, which she believes are skin tags.    She is considering discontinuing metformin and is seeking advice on this

## 2025-07-24 NOTE — PROGRESS NOTES
Administrations This Visit       cyanocobalamin injection 1,000 mcg       Admin Date  07/24/2025  12:14 Action  Given Dose  1,000 mcg Route  IntraMUSCular Site  Deltoid Left Documented By  Arlene Rinaldi MA    NDC: 40730-133-91    Lot#: [id number]    : [employer] [facility]    Patient Supplied?: No